# Patient Record
Sex: FEMALE | HISPANIC OR LATINO
[De-identification: names, ages, dates, MRNs, and addresses within clinical notes are randomized per-mention and may not be internally consistent; named-entity substitution may affect disease eponyms.]

---

## 2017-02-18 ENCOUNTER — BH HISTORICAL (OUTPATIENT)
Dept: OTHER | Age: 52
End: 2017-02-18

## 2017-04-10 ENCOUNTER — BH HISTORICAL (OUTPATIENT)
Dept: OTHER | Age: 52
End: 2017-04-10

## 2017-06-26 ENCOUNTER — HOSPITAL (OUTPATIENT)
Dept: OTHER | Age: 52
End: 2017-06-26
Attending: EMERGENCY MEDICINE

## 2017-07-28 ENCOUNTER — BH HISTORICAL (OUTPATIENT)
Dept: OTHER | Age: 52
End: 2017-07-28

## 2017-08-29 ENCOUNTER — BH HISTORICAL (OUTPATIENT)
Dept: OTHER | Age: 52
End: 2017-08-29

## 2017-10-03 ENCOUNTER — BH HISTORICAL (OUTPATIENT)
Dept: OTHER | Age: 52
End: 2017-10-03

## 2017-10-26 ENCOUNTER — BH HISTORICAL (OUTPATIENT)
Dept: OTHER | Age: 52
End: 2017-10-26

## 2018-01-23 ENCOUNTER — BH HISTORICAL (OUTPATIENT)
Dept: OTHER | Age: 53
End: 2018-01-23

## 2018-07-13 ENCOUNTER — BH HISTORICAL (OUTPATIENT)
Dept: OTHER | Age: 53
End: 2018-07-13

## 2018-10-11 ENCOUNTER — BH HISTORICAL (OUTPATIENT)
Dept: OTHER | Age: 53
End: 2018-10-11

## 2018-12-12 ENCOUNTER — TELEPHONE (OUTPATIENT)
Dept: SCHEDULING | Age: 53
End: 2018-12-12

## 2018-12-12 RX ORDER — TEMAZEPAM 30 MG/1
1 CAPSULE ORAL AT BEDTIME
COMMUNITY
Start: 2018-07-20 | End: 2018-12-13 | Stop reason: SDUPTHER

## 2018-12-13 ENCOUNTER — TELEPHONE (OUTPATIENT)
Dept: SCHEDULING | Age: 53
End: 2018-12-13

## 2018-12-13 RX ORDER — FLUPHENAZINE HYDROCHLORIDE 5 MG/1
3 TABLET ORAL DAILY
COMMUNITY
Start: 2018-07-13 | End: 2019-05-08 | Stop reason: SDUPTHER

## 2018-12-13 RX ORDER — TEMAZEPAM 30 MG/1
30 CAPSULE ORAL AT BEDTIME
Qty: 30 CAPSULE | Refills: 2 | OUTPATIENT
Start: 2018-12-13 | End: 2019-10-07

## 2018-12-13 RX ORDER — TEMAZEPAM 30 MG/1
30 CAPSULE ORAL AT BEDTIME
Qty: 30 CAPSULE | Refills: 2 | Status: SHIPPED | OUTPATIENT
Start: 2018-12-13 | End: 2019-03-08 | Stop reason: SDUPTHER

## 2018-12-13 RX ORDER — LITHIUM CARBONATE 300 MG/1
2 CAPSULE ORAL AT BEDTIME
COMMUNITY
Start: 2018-07-13 | End: 2018-12-24 | Stop reason: SDUPTHER

## 2018-12-26 RX ORDER — LITHIUM CARBONATE 300 MG/1
CAPSULE ORAL AT BEDTIME
Qty: 180 CAPSULE | Refills: 0 | Status: SHIPPED | OUTPATIENT
Start: 2018-12-26 | End: 2019-03-19 | Stop reason: SDUPTHER

## 2019-03-08 ENCOUNTER — TELEPHONE (OUTPATIENT)
Dept: SCHEDULING | Age: 54
End: 2019-03-08

## 2019-03-09 RX ORDER — TEMAZEPAM 30 MG/1
30 CAPSULE ORAL AT BEDTIME
Qty: 30 CAPSULE | Refills: 0 | Status: SHIPPED | OUTPATIENT
Start: 2019-03-09 | End: 2019-04-09 | Stop reason: SDUPTHER

## 2019-03-20 RX ORDER — LITHIUM CARBONATE 300 MG/1
CAPSULE ORAL AT BEDTIME
Qty: 180 CAPSULE | Refills: 0 | Status: SHIPPED | OUTPATIENT
Start: 2019-03-20 | End: 2019-06-18 | Stop reason: SDUPTHER

## 2019-03-21 ENCOUNTER — APPOINTMENT (OUTPATIENT)
Dept: BEHAVIORAL HEALTH | Age: 54
End: 2019-03-21

## 2019-04-09 ENCOUNTER — TELEPHONE (OUTPATIENT)
Dept: SCHEDULING | Age: 54
End: 2019-04-09

## 2019-04-09 RX ORDER — TEMAZEPAM 30 MG/1
30 CAPSULE ORAL AT BEDTIME
Qty: 30 CAPSULE | Refills: 0 | Status: SHIPPED | OUTPATIENT
Start: 2019-04-09 | End: 2019-05-08 | Stop reason: SDUPTHER

## 2019-04-09 RX ORDER — TEMAZEPAM 30 MG/1
30 CAPSULE ORAL AT BEDTIME
Qty: 30 CAPSULE | Refills: 0 | Status: SHIPPED | OUTPATIENT
Start: 2019-04-09 | End: 2019-05-08 | Stop reason: SINTOL

## 2019-05-08 ENCOUNTER — BEHAVIORAL HEALTH (OUTPATIENT)
Dept: BEHAVIORAL HEALTH | Age: 54
End: 2019-05-08

## 2019-05-08 DIAGNOSIS — F41.1 GAD (GENERALIZED ANXIETY DISORDER): ICD-10-CM

## 2019-05-08 DIAGNOSIS — G47.00 PERSISTENT INSOMNIA: ICD-10-CM

## 2019-05-08 DIAGNOSIS — F25.0 SCHIZOAFFECTIVE DISORDER, BIPOLAR TYPE (CMD): Primary | ICD-10-CM

## 2019-05-08 PROBLEM — F25.9 SCHIZOAFFECTIVE DISORDER (CMD): Status: ACTIVE | Noted: 2019-05-08

## 2019-05-08 PROCEDURE — 99214 OFFICE O/P EST MOD 30 MIN: CPT | Performed by: PSYCHIATRY & NEUROLOGY

## 2019-05-08 RX ORDER — FLUPHENAZINE HYDROCHLORIDE 5 MG/1
15 TABLET ORAL 2 TIMES DAILY
Qty: 60 TABLET | Refills: 5 | Status: SHIPPED | OUTPATIENT
Start: 2019-05-08 | End: 2019-06-06 | Stop reason: SDUPTHER

## 2019-05-08 RX ORDER — TEMAZEPAM 15 MG/1
15 CAPSULE ORAL NIGHTLY PRN
Qty: 30 CAPSULE | Refills: 5 | Status: SHIPPED | OUTPATIENT
Start: 2019-05-08 | End: 2019-10-04 | Stop reason: DRUGHIGH

## 2019-05-21 ENCOUNTER — APPOINTMENT (OUTPATIENT)
Dept: BEHAVIORAL HEALTH | Age: 54
End: 2019-05-21

## 2019-06-04 ENCOUNTER — TELEPHONE (OUTPATIENT)
Dept: BEHAVIORAL HEALTH | Age: 54
End: 2019-06-04

## 2019-06-06 RX ORDER — FLUPHENAZINE HYDROCHLORIDE 5 MG/1
15 TABLET ORAL 2 TIMES DAILY
Qty: 60 TABLET | Refills: 2 | Status: SHIPPED | OUTPATIENT
Start: 2019-06-06 | End: 2019-07-24 | Stop reason: SDUPTHER

## 2019-06-06 RX ORDER — FLUPHENAZINE HYDROCHLORIDE 10 MG/1
10 TABLET ORAL
COMMUNITY
End: 2019-10-04 | Stop reason: SDUPTHER

## 2019-06-06 RX ORDER — FLUPHENAZINE HYDROCHLORIDE 5 MG/1
TABLET ORAL
Qty: 540 TABLET | Refills: 2 | OUTPATIENT
Start: 2019-06-06

## 2019-06-14 ENCOUNTER — TELEPHONE (OUTPATIENT)
Dept: FAMILY MEDICINE CLINIC | Facility: CLINIC | Age: 54
End: 2019-06-14

## 2019-06-14 NOTE — TELEPHONE ENCOUNTER
Pt states she was seen another OB GYNE through [place] who said pt had \"cancerous cells on her pap\". Pt states she had a colpo and now they want to follow up more but pt states she doesn't know what she wants to do.  Pt would like a follow up visit with MICHAEL

## 2019-06-14 NOTE — TELEPHONE ENCOUNTER
Patient requesting an call back states haven't seen Dr. Dolores Vivas in a while but need to know did she have any abnormal Paps before

## 2019-06-18 RX ORDER — LITHIUM CARBONATE 300 MG/1
CAPSULE ORAL AT BEDTIME
Qty: 180 CAPSULE | Refills: 0 | Status: SHIPPED | OUTPATIENT
Start: 2019-06-18 | End: 2019-09-22 | Stop reason: SDUPTHER

## 2019-07-15 ENCOUNTER — TELEPHONE (OUTPATIENT)
Dept: OBGYN CLINIC | Facility: CLINIC | Age: 54
End: 2019-07-15

## 2019-07-15 NOTE — TELEPHONE ENCOUNTER
Patient requesting an call back from doctor Only in regard to results that was sent over from other Gynecologist . Patient will like to discuss

## 2019-07-19 ENCOUNTER — TELEPHONE (OUTPATIENT)
Dept: OBGYN CLINIC | Facility: CLINIC | Age: 54
End: 2019-07-19

## 2019-07-19 NOTE — TELEPHONE ENCOUNTER
Spoke with pt and explained that fax was received 3 days ago and [facility]. Pt states she has appmnt on 8/2/19 and she can't be only seen end of July/beginning of August r/t insurance.      Informed pt that msg will be sent to [facility] as pt is wendy

## 2019-07-19 NOTE — TELEPHONE ENCOUNTER
JORGE ALBERTOTCDELIA    (Fax report received on 7/16/19; [place] on vacation)    Pt's last office visit was 11/3/16

## 2019-07-19 NOTE — TELEPHONE ENCOUNTER
Pt calling regarding pap results that she had faxed over per pt some one told her they are on Dr GUERRERO desk. Pt is concerned regarding precancerous cells.

## 2019-07-23 NOTE — TELEPHONE ENCOUNTER
I talked to Leann Hinton regarding her medical records from previous doctor regarding endometrial biopsy and cervical biopsy. Her cervical biopsy showed NATACHA-1 and endometrial biopsy was negative for any pathology.     Patient has an appointment to see me first we

## 2019-07-23 NOTE — TELEPHONE ENCOUNTER
Patient calling back to talk to Dr. Hartman Drilling about reviewing records from other physician, her pap smear and biopsy.

## 2019-07-23 NOTE — TELEPHONE ENCOUNTER
I called patient and left message on her home as well as mobile phone voicemail to have her call me back in the office. I called her at 1:40 PM and told her that I will be in the office till at least 4:00.

## 2019-07-25 RX ORDER — FLUPHENAZINE HYDROCHLORIDE 5 MG/1
TABLET ORAL
Qty: 540 TABLET | Refills: 2 | Status: SHIPPED | OUTPATIENT
Start: 2019-07-25 | End: 2019-07-30 | Stop reason: SDUPTHER

## 2019-07-29 ENCOUNTER — TELEPHONE (OUTPATIENT)
Dept: BEHAVIORAL HEALTH | Age: 54
End: 2019-07-29

## 2019-07-30 RX ORDER — FLUPHENAZINE HYDROCHLORIDE 5 MG/1
TABLET ORAL
Qty: 180 TABLET | Refills: 1 | Status: SHIPPED | OUTPATIENT
Start: 2019-07-30 | End: 2019-10-04 | Stop reason: SDUPTHER

## 2019-08-02 ENCOUNTER — OFFICE VISIT (OUTPATIENT)
Dept: OBGYN CLINIC | Facility: CLINIC | Age: 54
End: 2019-08-02
Payer: MEDICARE

## 2019-08-02 VITALS — DIASTOLIC BLOOD PRESSURE: 64 MMHG | WEIGHT: 158 LBS | SYSTOLIC BLOOD PRESSURE: 120 MMHG | BODY MASS INDEX: 28 KG/M2

## 2019-08-02 DIAGNOSIS — Z01.419 ENCOUNTER FOR GYNECOLOGICAL EXAMINATION WITHOUT ABNORMAL FINDING: Primary | ICD-10-CM

## 2019-08-02 PROBLEM — N87.0 DYSPLASIA OF CERVIX, LOW GRADE (CIN 1): Status: ACTIVE | Noted: 2019-08-02

## 2019-08-02 PROBLEM — E04.2 MULTIPLE THYROID NODULES: Status: ACTIVE | Noted: 2019-08-02

## 2019-08-02 PROBLEM — R73.03 PREDIABETES: Status: ACTIVE | Noted: 2019-08-02

## 2019-08-02 PROCEDURE — G0101 CA SCREEN;PELVIC/BREAST EXAM: HCPCS | Performed by: OBSTETRICS & GYNECOLOGY

## 2019-08-02 PROCEDURE — 88175 CYTOPATH C/V AUTO FLUID REDO: CPT | Performed by: OBSTETRICS & GYNECOLOGY

## 2019-08-02 PROCEDURE — 87624 HPV HI-RISK TYP POOLED RSLT: CPT | Performed by: OBSTETRICS & GYNECOLOGY

## 2019-08-02 RX ORDER — LITHIUM CARBONATE 300 MG/1
CAPSULE ORAL
COMMUNITY
Start: 2019-06-18

## 2019-08-02 RX ORDER — TEMAZEPAM 30 MG/1
CAPSULE ORAL
COMMUNITY
Start: 2015-05-06 | End: 2019-11-04

## 2019-08-02 RX ORDER — FLUPHENAZINE HYDROCHLORIDE 5 MG/1
TABLET ORAL
COMMUNITY
Start: 2019-07-30 | End: 2019-08-02

## 2019-08-02 NOTE — PROGRESS NOTES
Georges Mcdowell is here for a checkup. I have not seen her since 2016. Patient was seen at [facility] and had Pap smear August 2018 which showed ASCUS and positive HPV. Patient had cervical biopsy which showed NATACHA-1.     She had endometrial biopsy Ma Orders Placed This Encounter      Hpv Dna  High Risk , Thin Prep Collect      ThinPrep PAP Smear      PRESCRIPTIONS:     Requested Prescriptions      No prescriptions requested or ordered in this encounter       IMAGING/ REFERRALS:      None     (Z01.4

## 2019-08-04 LAB — HPV I/H RISK 1 DNA SPEC QL NAA+PROBE: NEGATIVE

## 2019-08-06 ENCOUNTER — TELEPHONE (OUTPATIENT)
Dept: OBGYN CLINIC | Facility: CLINIC | Age: 54
End: 2019-08-06

## 2019-08-06 NOTE — TELEPHONE ENCOUNTER
Pt calling back for pap results, pt is aware pap is in process and will receive a call when results are back.

## 2019-08-07 NOTE — TELEPHONE ENCOUNTER
Alberto Rae MD           8/6/19 4:56 PM   Note      I called Thadedus Dinh to let her know that her Pap smear showed ASCUS with negative HPV and that she needs to have Pap smear repeated again in 3 months.

## 2019-09-23 RX ORDER — LITHIUM CARBONATE 300 MG/1
CAPSULE ORAL AT BEDTIME
Qty: 180 CAPSULE | Refills: 0 | Status: SHIPPED | OUTPATIENT
Start: 2019-09-23 | End: 2019-10-21 | Stop reason: SDUPTHER

## 2019-09-27 ENCOUNTER — TELEPHONE (OUTPATIENT)
Dept: OBGYN CLINIC | Facility: CLINIC | Age: 54
End: 2019-09-27

## 2019-09-27 NOTE — TELEPHONE ENCOUNTER
Spoke to pt and informed her that her pap smear showed some abnormal cells and that a repeat pap is recommended in 3 months.

## 2019-10-04 ENCOUNTER — OFFICE VISIT (OUTPATIENT)
Dept: BEHAVIORAL HEALTH | Age: 54
End: 2019-10-04

## 2019-10-04 DIAGNOSIS — G47.00 PERSISTENT INSOMNIA: ICD-10-CM

## 2019-10-04 DIAGNOSIS — F25.0 SCHIZOAFFECTIVE DISORDER, BIPOLAR TYPE (CMD): Primary | ICD-10-CM

## 2019-10-04 DIAGNOSIS — F41.1 GAD (GENERALIZED ANXIETY DISORDER): ICD-10-CM

## 2019-10-04 PROCEDURE — 99214 OFFICE O/P EST MOD 30 MIN: CPT | Performed by: PSYCHIATRY & NEUROLOGY

## 2019-10-04 RX ORDER — TEMAZEPAM 7.5 MG/1
7.5 CAPSULE ORAL NIGHTLY PRN
Qty: 30 CAPSULE | Refills: 3 | Status: SHIPPED | OUTPATIENT
Start: 2019-10-04 | End: 2020-01-20 | Stop reason: SDUPTHER

## 2019-10-04 RX ORDER — FLUPHENAZINE HYDROCHLORIDE 5 MG/1
TABLET ORAL
Qty: 90 TABLET | Refills: 1 | Status: SHIPPED
Start: 2019-10-04 | End: 2020-01-20 | Stop reason: SDUPTHER

## 2019-10-11 ENCOUNTER — APPOINTMENT (OUTPATIENT)
Dept: BEHAVIORAL HEALTH | Age: 54
End: 2019-10-11

## 2019-10-21 ENCOUNTER — TELEPHONE (OUTPATIENT)
Dept: BEHAVIORAL HEALTH | Age: 54
End: 2019-10-21

## 2019-10-21 RX ORDER — LITHIUM CARBONATE 300 MG/1
600 CAPSULE ORAL AT BEDTIME
Qty: 180 CAPSULE | Refills: 0 | Status: SHIPPED | OUTPATIENT
Start: 2019-10-21 | End: 2020-02-10 | Stop reason: SDUPTHER

## 2019-11-04 ENCOUNTER — OFFICE VISIT (OUTPATIENT)
Dept: OBGYN CLINIC | Facility: CLINIC | Age: 54
End: 2019-11-04
Payer: MEDICARE

## 2019-11-04 VITALS
WEIGHT: 158 LBS | BODY MASS INDEX: 28 KG/M2 | DIASTOLIC BLOOD PRESSURE: 70 MMHG | SYSTOLIC BLOOD PRESSURE: 122 MMHG | HEIGHT: 63 IN

## 2019-11-04 DIAGNOSIS — R87.610 ASCUS OF CERVIX WITH NEGATIVE HIGH RISK HPV: Primary | ICD-10-CM

## 2019-11-04 PROBLEM — Z72.0 TOBACCO USER: Status: ACTIVE | Noted: 2019-10-07

## 2019-11-04 PROBLEM — F31.9 BIPOLAR DISORDER (HCC): Status: ACTIVE | Noted: 2019-10-07

## 2019-11-04 PROBLEM — E03.9 HYPOTHYROIDISM: Status: ACTIVE | Noted: 2019-10-07

## 2019-11-04 PROBLEM — N87.0 DYSPLASIA OF CERVIX, LOW GRADE (CIN 1): Status: RESOLVED | Noted: 2019-08-02 | Resolved: 2019-11-04

## 2019-11-04 PROBLEM — E66.3 OVERWEIGHT: Status: ACTIVE | Noted: 2019-10-07

## 2019-11-04 PROBLEM — G47.30 SLEEP APNEA: Status: ACTIVE | Noted: 2019-10-07

## 2019-11-04 PROBLEM — E04.2 MULTINODULAR GOITER: Status: ACTIVE | Noted: 2019-10-07

## 2019-11-04 PROBLEM — F25.9 SCHIZOAFFECTIVE DISORDER (HCC): Status: ACTIVE | Noted: 2019-05-08

## 2019-11-04 PROCEDURE — 99213 OFFICE O/P EST LOW 20 MIN: CPT | Performed by: OBSTETRICS & GYNECOLOGY

## 2019-11-04 PROCEDURE — 87624 HPV HI-RISK TYP POOLED RSLT: CPT | Performed by: OBSTETRICS & GYNECOLOGY

## 2019-11-04 PROCEDURE — 88175 CYTOPATH C/V AUTO FLUID REDO: CPT | Performed by: OBSTETRICS & GYNECOLOGY

## 2019-11-04 RX ORDER — TEMAZEPAM 7.5 MG/1
7.5 CAPSULE ORAL
COMMUNITY
Start: 2019-10-04

## 2019-11-04 NOTE — PROGRESS NOTES
Lenoard Leyden is here for repeat Pap smear. Her previous Pap smear showed ASCUS with negative HPV. Pap smear previous to that was negative HPV and normal.    Patient's  passed away and she really .   Patient says that her current  is alcoholi HPV    PLAN:     Depending upon the results of her current Pap smear I would like to see her in 1 month for colposcopy if it is abnormal with positive HPV or in 6 months. ORDERS:     No orders of the defined types were placed in this encounter.

## 2019-12-10 ENCOUNTER — TELEPHONE (OUTPATIENT)
Dept: BEHAVIORAL HEALTH | Age: 54
End: 2019-12-10

## 2019-12-17 ENCOUNTER — APPOINTMENT (OUTPATIENT)
Dept: BEHAVIORAL HEALTH | Age: 54
End: 2019-12-17

## 2020-01-02 ENCOUNTER — APPOINTMENT (OUTPATIENT)
Dept: BEHAVIORAL HEALTH | Age: 55
End: 2020-01-02

## 2020-01-07 ENCOUNTER — APPOINTMENT (OUTPATIENT)
Dept: BEHAVIORAL HEALTH | Age: 55
End: 2020-01-07

## 2020-01-20 ENCOUNTER — TELEPHONE (OUTPATIENT)
Dept: SCHEDULING | Age: 55
End: 2020-01-20

## 2020-01-20 ENCOUNTER — TELEPHONE (OUTPATIENT)
Dept: BEHAVIORAL HEALTH | Age: 55
End: 2020-01-20

## 2020-01-21 RX ORDER — TEMAZEPAM 7.5 MG/1
7.5 CAPSULE ORAL NIGHTLY PRN
Qty: 30 CAPSULE | Refills: 3 | Status: SHIPPED | OUTPATIENT
Start: 2020-01-21 | End: 2020-02-10 | Stop reason: SDUPTHER

## 2020-01-21 RX ORDER — FLUPHENAZINE HYDROCHLORIDE 5 MG/1
TABLET ORAL
Qty: 90 TABLET | Refills: 1 | Status: SHIPPED | OUTPATIENT
Start: 2020-01-21 | End: 2020-05-31 | Stop reason: SDUPTHER

## 2020-02-07 ENCOUNTER — TELEPHONE (OUTPATIENT)
Dept: OBGYN CLINIC | Facility: CLINIC | Age: 55
End: 2020-02-07

## 2020-02-07 NOTE — TELEPHONE ENCOUNTER
Pt had a f/u pap smear in 11/2019 that was WNL and negative HPV. Pt had a ASCUS with negative HPV in 8/2/2019. Pt asking when to follow up and what would happen if it was abnormal again.   Pt informed that she is to follow up in May of  2020 and if abnormal

## 2020-02-08 ENCOUNTER — TELEPHONE (OUTPATIENT)
Dept: SCHEDULING | Age: 55
End: 2020-02-08

## 2020-02-10 ENCOUNTER — TELEPHONE (OUTPATIENT)
Dept: BEHAVIORAL HEALTH | Age: 55
End: 2020-02-10

## 2020-02-10 RX ORDER — LITHIUM CARBONATE 300 MG/1
600 CAPSULE ORAL AT BEDTIME
Qty: 180 CAPSULE | Refills: 1 | Status: SHIPPED | OUTPATIENT
Start: 2020-02-10

## 2020-02-10 RX ORDER — TEMAZEPAM 7.5 MG/1
7.5 CAPSULE ORAL NIGHTLY PRN
Qty: 30 CAPSULE | Refills: 3 | Status: SHIPPED | OUTPATIENT
Start: 2020-02-10 | End: 2020-05-31 | Stop reason: SDUPTHER

## 2020-02-13 ENCOUNTER — OFFICE VISIT (OUTPATIENT)
Dept: BEHAVIORAL HEALTH | Age: 55
End: 2020-02-13

## 2020-02-13 DIAGNOSIS — G47.00 PERSISTENT INSOMNIA: ICD-10-CM

## 2020-02-13 DIAGNOSIS — F25.0 SCHIZOAFFECTIVE DISORDER, BIPOLAR TYPE (CMD): Primary | ICD-10-CM

## 2020-02-13 DIAGNOSIS — F41.1 GAD (GENERALIZED ANXIETY DISORDER): ICD-10-CM

## 2020-02-13 PROCEDURE — 99214 OFFICE O/P EST MOD 30 MIN: CPT | Performed by: PSYCHIATRY & NEUROLOGY

## 2020-03-27 ENCOUNTER — APPOINTMENT (OUTPATIENT)
Dept: BEHAVIORAL HEALTH | Age: 55
End: 2020-03-27

## 2020-05-04 ENCOUNTER — TELEPHONE (OUTPATIENT)
Dept: OBGYN CLINIC | Facility: CLINIC | Age: 55
End: 2020-05-04

## 2020-06-01 RX ORDER — FLUPHENAZINE HYDROCHLORIDE 5 MG/1
TABLET ORAL
Qty: 90 TABLET | Refills: 1 | Status: SHIPPED | OUTPATIENT
Start: 2020-06-01

## 2020-06-01 RX ORDER — TEMAZEPAM 7.5 MG/1
7.5 CAPSULE ORAL NIGHTLY PRN
Qty: 30 CAPSULE | Refills: 3 | Status: SHIPPED | OUTPATIENT
Start: 2020-06-01

## 2020-06-08 ENCOUNTER — APPOINTMENT (OUTPATIENT)
Dept: BEHAVIORAL HEALTH | Age: 55
End: 2020-06-08

## 2020-06-16 ENCOUNTER — OFFICE VISIT (OUTPATIENT)
Dept: OBGYN CLINIC | Facility: CLINIC | Age: 55
End: 2020-06-16
Payer: MEDICARE

## 2020-06-16 VITALS
DIASTOLIC BLOOD PRESSURE: 64 MMHG | WEIGHT: 160 LBS | BODY MASS INDEX: 28.35 KG/M2 | SYSTOLIC BLOOD PRESSURE: 104 MMHG | HEIGHT: 63 IN

## 2020-06-16 DIAGNOSIS — Z01.419 WOMEN'S ANNUAL ROUTINE GYNECOLOGICAL EXAMINATION: Primary | ICD-10-CM

## 2020-06-16 PROCEDURE — G0101 CA SCREEN;PELVIC/BREAST EXAM: HCPCS | Performed by: OBSTETRICS & GYNECOLOGY

## 2020-06-16 PROCEDURE — 88175 CYTOPATH C/V AUTO FLUID REDO: CPT | Performed by: OBSTETRICS & GYNECOLOGY

## 2020-06-16 PROCEDURE — 87624 HPV HI-RISK TYP POOLED RSLT: CPT | Performed by: OBSTETRICS & GYNECOLOGY

## 2020-06-16 RX ORDER — AZELASTINE 1 MG/ML
2 SPRAY, METERED NASAL 2 TIMES DAILY
COMMUNITY
Start: 2020-03-16

## 2020-06-16 NOTE — PROGRESS NOTES
Ronni Vaca a checkup. She has no gynecologic complaints. Her previous Pap smear was normal.  Previous to that it was ASCUS with negative HPV. Patient says that she is currently smoking half a pack of cigarettes a day and trying to quit.     She has not had Oral Cap Take 7.5 mg by mouth. • Lithium Carbonate 300 MG Oral Cap Take by mouth. • fluPHENAZine HCl 5 MG Oral Tab TK 3 TS PO BID  5   • Cholecalciferol ( ULTRA STRENGTH) 2000 units Oral Cap Take 2,000 Units by mouth.          Family History BMI 28.34 kg/m²     GENERAL: well developed, well nourished, in no apparent distress  SKIN: no rashes, no suspicious lesions  HEENT: normal  NECK: supple; no thyroidmegaly, no adenopathy  LUNGS: clear to auscultation  CARDIOVASCULAR: normal S1, S2, RRR  BR

## 2020-06-22 ENCOUNTER — TELEPHONE (OUTPATIENT)
Dept: OBGYN CLINIC | Facility: CLINIC | Age: 55
End: 2020-06-22

## 2020-06-22 NOTE — TELEPHONE ENCOUNTER
Pt called and provided HPV and PAP results of normal.  Pt request when to be seen again and reviewed Dr. Lady Altman notes for pt to return in 6 months. Return in about 6 months (around 12/16/2020).

## 2020-10-27 ENCOUNTER — TELEPHONE (OUTPATIENT)
Dept: OBGYN CLINIC | Facility: CLINIC | Age: 55
End: 2020-10-27

## 2020-10-27 NOTE — TELEPHONE ENCOUNTER
Patient wants to know when is the first time she saw Dr Suzanne Marsh and her weight that day also   Please call back

## 2020-10-27 NOTE — TELEPHONE ENCOUNTER
Called pt and provided information available in Epic. Pt verbalized understanding.         [facility]

## 2021-09-24 ENCOUNTER — TELEPHONE (OUTPATIENT)
Dept: SCHEDULING | Age: 56
End: 2021-09-24

## 2023-08-15 NOTE — TELEPHONE ENCOUNTER
I called Sanjana Araujo to let her know that her Pap smear showed ASCUS with negative HPV and that she needs to have Pap smear repeated again in 3 months. Azithromycin Counseling:  I discussed with the patient the risks of azithromycin including but not limited to GI upset, allergic reaction, drug rash, diarrhea, and yeast infections.

## (undated) NOTE — MR AVS SNAPSHOT
After Visit Summary   6/16/2020    Karma Hatchet    MRN: [id number]           Visit Information     Date & Time  6/16/2020 11:00 AM Provider  Deirdre Gamboa, [address], [facility] Dept.  P Instructions:   To schedule a test at any [facility], call Central Scheduling at [phone], Monday through Friday between 7:30am to 6pm and on Saturday between 8am and 1pm. Online scheduling is available at [email or web address] 4. Enter your Zip Code and Date of Birth (mm/dd/yyyy) as indicated and click Next. You will be taken to the next sign-up page. 5. Create a MyChart Username.  This will be your [employer] login Username and cannot be changed, so think of one that is secure and Get your heart pumping – brisk walking, biking, swimming Even 10 minute increments are effective and add up over the week   2 ½ hours per week – spread out over time Use a johnathan to keep you motivated   Don’t forget strength training with weights and resist Monday – Friday  8:00 am – 8:00 pm   Saturday – Sunday  8:00 am – 4:00 pm    WALK-IN CARE  Primary Care Providers  Treatment for acute illness   or injury that are   non-life-threatening.   Also available by appointment     Average cost  $70*       [facility]

## (undated) NOTE — MR AVS SNAPSHOT
After Visit Summary   11/4/2019    Marly Zimmerman    MRN: [id number]           Visit Information     Date & Time  11/4/2019  1:45 PM Provider  Yusuf Ruby, [address], [facility] Dept.  P 1. In your Internet browser, go to [email or web address]  2. Click on the Activate your Account if you have an activation code in the box under the *New User? section. 3. Enter your [employer] Activation Code exactly as it appears below.  You will help us do so. For more information on [employer], please visit [email or web address]                   DO YOU KNOW WHERE TO GO? Injury & illness are never convenient.  If you are dealing with a   non-emergency, consider your o